# Patient Record
Sex: FEMALE
[De-identification: names, ages, dates, MRNs, and addresses within clinical notes are randomized per-mention and may not be internally consistent; named-entity substitution may affect disease eponyms.]

---

## 2017-06-28 ENCOUNTER — RESULT REVIEW (OUTPATIENT)
Age: 62
End: 2017-06-28

## 2021-03-31 PROBLEM — Z00.00 ENCOUNTER FOR PREVENTIVE HEALTH EXAMINATION: Status: ACTIVE | Noted: 2021-03-31

## 2021-05-11 DIAGNOSIS — N39.0 URINARY TRACT INFECTION, SITE NOT SPECIFIED: ICD-10-CM

## 2021-05-12 ENCOUNTER — APPOINTMENT (OUTPATIENT)
Dept: UROGYNECOLOGY | Facility: CLINIC | Age: 66
End: 2021-05-12
Payer: COMMERCIAL

## 2021-05-12 DIAGNOSIS — N81.4 UTEROVAGINAL PROLAPSE, UNSPECIFIED: ICD-10-CM

## 2021-05-12 DIAGNOSIS — N95.2 POSTMENOPAUSAL ATROPHIC VAGINITIS: ICD-10-CM

## 2021-05-12 DIAGNOSIS — N39.8 OTHER SPECIFIED DISORDERS OF URINARY SYSTEM: ICD-10-CM

## 2021-05-12 PROCEDURE — 99205 OFFICE O/P NEW HI 60 MIN: CPT

## 2021-05-12 PROCEDURE — 51798 US URINE CAPACITY MEASURE: CPT

## 2021-05-12 PROCEDURE — 99072 ADDL SUPL MATRL&STAF TM PHE: CPT

## 2021-05-12 RX ORDER — ESTRADIOL 0.1 MG/G
0.1 CREAM VAGINAL
Qty: 1 | Refills: 3 | Status: ACTIVE | COMMUNITY
Start: 2021-05-12 | End: 1900-01-01

## 2021-05-12 NOTE — PHYSICAL EXAM
[No Acute Distress] : in no acute distress [Well developed] : well developed [Well Nourished] : ~L well nourished [Good Hygeine] : demonstrates good hygeine [Oriented x3] : oriented to person, place, and time [Normal Memory] : ~T memory was ~L unimpaired [Normal Mood/Affect] : mood and affect are normal [Normal Lung Sounds] : the lungs were clear to auscultation [Respirations regular] : ~T respiratory rate was regular [Rate & Rhythm Regular] : ~T heart rate and rhythm were normal [No Edema] : ~T edema was not present [Supple] : ~T the neck demonstrated no ~M decrease in suppleness [Thyroid Normal] : the thyroid ~T showed no abnormalities [Symmetrical] : the neck was ~L symmetrical [Scar] : a scar was noted [Normal Gait] : gait was normal [Labia Majora] : were normal [Labia Minora] : were normal [Normal Appearance] : general appearance was normal [Atrophy] : atrophy [No Bleeding] : there was no active vaginal bleeding [Aa ____] : Aa [unfilled] [Ba ____] : Ba [unfilled] [C ____] : C [unfilled] [GH ____] : GH [unfilled] [PB ____] : PB [unfilled] [TVL ____] : TVL  [unfilled] [Ap ____] : Ap [unfilled] [Bp ____] : Bp [unfilled] [D ____] : D [unfilled] [] : II [Post Void Residual ____ml] : post void residual was [unfilled] ml [Exam Deferred] : was deferred [Anxiety] : patient is not anxious [Cough] : no cough [Dyspnea] : no dyspnea [Murmurs] : no murmurs were heard [Varicose Veins] : no varicose veins observed [Tracheal Deviation] : no tracheal deviation observed [Mass] : no ~M [unfilled] neck mass was observed [Mass (___ Cm)] : no ~M [unfilled] abdominal mass was palpated [Tenderness] : ~T no ~M abdominal tenderness observed [Distended] : not distended [H/Smegaly] : no hepatosplenomegaly [Hernia] : no hernia observed [Normal] : was normal [Estrogen Effect] : no estrogen effect was observed

## 2021-05-12 NOTE — ASSESSMENT
[FreeTextEntry1] : Today's findings were discussed with the pt and written pamphlets were provided for vaginal prolapse and vaginal atrophy.  After discussing various options of treatment for , she opted to proceed with pessary fitting.\par She was prescribed with estrogen cream.\par Total time spent for counseling was 1 hr\par \par

## 2021-05-12 NOTE — HISTORY OF PRESENT ILLNESS
[FreeTextEntry1] : The pt is a 66 y/o P3 with prolapse for last few yrs, which is bothersome for her, feels annoyed but cannot put a number on it. She is here for second opinion. \par She denies UI. \par She feels complete emptying of her bladder, however sometimes needs to sit longer on the toilet to completely empty. \par She voids every 2-3 hours with a varied volume and has nocturia 1x with a medium volume\par Her liquid intake consists of 1 cup coffee, 48 oz water /day.\par She has a BM q day. Feels complete evacuation. \par She denies gross hematuria, recurrent UTIs, hx of nephrolithiaisis. Denies splinting. \par Her last PAP was , and she denies a hx of abnormal PAP.\par The last time she had intercourse was 1 week. Denies dyspareunia.  \par Her abdominal surgical hx is significant for  x1. She denies surgical complications.\par She has no history of bruising, excessive bleeding, petechiae.\par \par

## 2021-05-15 ENCOUNTER — NON-APPOINTMENT (OUTPATIENT)
Age: 66
End: 2021-05-15

## 2021-05-15 LAB — BACTERIA UR CULT: NORMAL

## 2023-12-20 ENCOUNTER — LABORATORY RESULT (OUTPATIENT)
Age: 68
End: 2023-12-20

## 2023-12-20 ENCOUNTER — APPOINTMENT (OUTPATIENT)
Dept: PULMONOLOGY | Facility: CLINIC | Age: 68
End: 2023-12-20
Payer: COMMERCIAL

## 2023-12-20 VITALS
HEIGHT: 66 IN | RESPIRATION RATE: 16 BRPM | DIASTOLIC BLOOD PRESSURE: 76 MMHG | WEIGHT: 106 LBS | BODY MASS INDEX: 17.04 KG/M2 | OXYGEN SATURATION: 96 % | HEART RATE: 60 BPM | SYSTOLIC BLOOD PRESSURE: 129 MMHG

## 2023-12-20 LAB — DEPRECATED D DIMER PPP IA-ACNC: <150 NG/ML DDU

## 2023-12-20 PROCEDURE — 36415 COLL VENOUS BLD VENIPUNCTURE: CPT

## 2023-12-20 PROCEDURE — 99204 OFFICE O/P NEW MOD 45 MIN: CPT | Mod: 25

## 2023-12-20 PROCEDURE — 94010 BREATHING CAPACITY TEST: CPT

## 2023-12-20 PROCEDURE — 95012 NITRIC OXIDE EXP GAS DETER: CPT

## 2023-12-20 PROCEDURE — 71046 X-RAY EXAM CHEST 2 VIEWS: CPT

## 2023-12-20 NOTE — PHYSICAL EXAM
[No Acute Distress] : no acute distress [Well Nourished] : well nourished [Well Developed] : well developed [II] : Mallampati Class: II [No Resp Distress] : no resp distress [No Acc Muscle Use] : no acc muscle use [Clear to Auscultation Bilaterally] : clear to auscultation bilaterally [Oriented x3] : oriented x3 [TextBox_11] : +post nasal drip

## 2023-12-20 NOTE — PROCEDURE
[FreeTextEntry1] : the labs were drawn in the office today 12/2023 niox 25 12/2023 moderate obstruction PA and lateral chest xray performed using standard projections. Bones and soft tissues structures are unremarkable.Cardiac silhouette appears normal. Lung fields are clear. No infiltrate or masses noted. Mediastinal contours are normal. IMPRESSION: no acute cardiopulmonary disease

## 2023-12-20 NOTE — HISTORY OF PRESENT ILLNESS
[Never] : never [TextBox_4] : ELY QUINTERO is a 68 year old female who presents with cough X 5 weeks ago went to  in [place]- reportely swab negatives no xray done s/p zpak   + post nasal adrip no history of asthma  no dyspnea no wheezing  cough throughout the day dry cough   reports PCP in the past doing annual xray- does not have images from them

## 2023-12-21 ENCOUNTER — NON-APPOINTMENT (OUTPATIENT)
Age: 68
End: 2023-12-21

## 2023-12-21 LAB — NT-PROBNP SERPL-MCNC: 103 PG/ML

## 2023-12-26 LAB
A ALTERNATA IGE QN: <0.1 KUA/L
A FLAVUS AB FLD QL: NEGATIVE
A FUMIGATUS AB FLD QL: NEGATIVE
A FUMIGATUS IGE QN: <0.1 KUA/L
A NIGER AB FLD QL: NEGATIVE
ALMOND IGE QN: <0.1 KUA/L
BRAZIL NUT IGE QN: <0.1 KUA/L
C ALBICANS IGE QN: <0.1 KUA/L
C HERBARUM IGE QN: <0.1 KUA/L
CASHEW NUT IGE QN: <0.1 KUA/L
CAT DANDER IGE QN: <0.1 KUA/L
CODFISH IGE QN: <0.1 KUA/L
COMMON RAGWEED IGE QN: <0.1 KUA/L
COW MILK IGE QN: <0.1 KUA/L
D FARINAE IGE QN: <0.1 KUA/L
D PTERONYSS IGE QN: <0.1 KUA/L
DEPRECATED A ALTERNATA IGE RAST QL: 0 (ref 0–?)
DEPRECATED A FUMIGATUS IGE RAST QL: 0 (ref 0–?)
DEPRECATED ALMOND IGE RAST QL: 0 (ref 0–?)
DEPRECATED BRAZIL NUT IGE RAST QL: 0 (ref 0–?)
DEPRECATED C ALBICANS IGE RAST QL: 0
DEPRECATED C HERBARUM IGE RAST QL: 0 (ref 0–?)
DEPRECATED CASHEW NUT IGE RAST QL: 0 (ref 0–?)
DEPRECATED CAT DANDER IGE RAST QL: 0 (ref 0–?)
DEPRECATED CODFISH IGE RAST QL: 0 (ref 0–?)
DEPRECATED COMMON RAGWEED IGE RAST QL: 0 (ref 0–?)
DEPRECATED COW MILK IGE RAST QL: 0 (ref 0–?)
DEPRECATED D FARINAE IGE RAST QL: 0 (ref 0–?)
DEPRECATED D PTERONYSS IGE RAST QL: 0 (ref 0–?)
DEPRECATED DOG DANDER IGE RAST QL: 0 (ref 0–?)
DEPRECATED EGG WHITE IGE RAST QL: 0 (ref 0–?)
DEPRECATED HAZELNUT IGE RAST QL: 0 (ref 0–?)
DEPRECATED M RACEMOSUS IGE RAST QL: 0
DEPRECATED PEANUT IGE RAST QL: 0 (ref 0–?)
DEPRECATED ROACH IGE RAST QL: 0 (ref 0–?)
DEPRECATED SALMON IGE RAST QL: 0 (ref 0–?)
DEPRECATED SCALLOP IGE RAST QL: <0.1 KUA/L
DEPRECATED SESAME SEED IGE RAST QL: 0 (ref 0–?)
DEPRECATED SHRIMP IGE RAST QL: 0 (ref 0–?)
DEPRECATED SOYBEAN IGE RAST QL: 0 (ref 0–?)
DEPRECATED TIMOTHY IGE RAST QL: 0 (ref 0–?)
DEPRECATED TUNA IGE RAST QL: 0 (ref 0–?)
DEPRECATED WALNUT IGE RAST QL: 0 (ref 0–?)
DEPRECATED WHEAT IGE RAST QL: 0 (ref 0–?)
DEPRECATED WHITE OAK IGE RAST QL: 0 (ref 0–?)
DOG DANDER IGE QN: <0.1 KUA/L
EGG WHITE IGE QN: <0.1 KUA/L
HAZELNUT IGE QN: <0.1 KUA/L
M RACEMOSUS IGE QN: <0.1 KUA/L
PEANUT IGE QN: <0.1 KUA/L
ROACH IGE QN: <0.1 KUA/L
SALMON IGE QN: <0.1 KUA/L
SCALLOP IGE QN: 0 (ref 0–?)
SCALLOP IGE QN: <0.1 KUA/L
SESAME SEED IGE QN: <0.1 KUA/L
SOYBEAN IGE QN: <0.1 KUA/L
TIMOTHY IGE QN: <0.1 KUA/L
TOTAL IGE SMQN RAST: 7 KU/L
TUNA IGE QN: <0.1 KUA/L
WALNUT IGE QN: <0.1 KUA/L
WHEAT IGE QN: <0.1 KUA/L
WHITE OAK IGE QN: <0.1 KUA/L

## 2023-12-28 ENCOUNTER — APPOINTMENT (OUTPATIENT)
Dept: PULMONOLOGY | Facility: CLINIC | Age: 68
End: 2023-12-28
Payer: COMMERCIAL

## 2023-12-28 VITALS
RESPIRATION RATE: 16 BRPM | DIASTOLIC BLOOD PRESSURE: 67 MMHG | SYSTOLIC BLOOD PRESSURE: 110 MMHG | OXYGEN SATURATION: 95 % | HEART RATE: 71 BPM

## 2023-12-28 PROCEDURE — 94060 EVALUATION OF WHEEZING: CPT

## 2023-12-28 PROCEDURE — 99214 OFFICE O/P EST MOD 30 MIN: CPT | Mod: 25

## 2023-12-28 RX ORDER — FLUTICASONE PROPIONATE 50 UG/1
50 SPRAY, METERED NASAL
Qty: 1 | Refills: 3 | Status: ACTIVE | COMMUNITY
Start: 2023-12-28 | End: 1900-01-01

## 2023-12-28 NOTE — HISTORY OF PRESENT ILLNESS
[TextBox_4] : ELY QUINTERO is a 68 year old female, with history of GERD, who presents to the office for follow up evaluation. Patient reports that she continues to have a cough that has been ongoing for 6 weeks. She denies of having any known Hx of pulmonary diseases.She was seen in our office chest x-ray was performed this was normal spirometry showed evidence of obstruction and she was started Flovent this is not.  She initially states that this was the first time she ever had significant cough however patient does have a preceding history of acid reflux disease.She is also complaining of nasal drip and nasal congestion

## 2023-12-28 NOTE — PROCEDURE
[FreeTextEntry1] : Spirometry demonstrates  airway obstruction with no improvement post bronchodilator.  Review of chest x-ray shows no significant findings

## 2023-12-28 NOTE — ASSESSMENT
[FreeTextEntry1] : Ms. ELY QUINTERO is an 68 year old female, history of GERD. Chronic cough with normal chest x-ray and mild airway obstruction indicated in spirometry. Prescribed Fluticasone nasal spray 2 sprays each nostril once a day, Symbicort 2 puffs twice a day, and Omeprazole 20 mg 1 tablet twice a day. Follow up in 1 month. Patient concerned about lung cancer.  I reassured her based on her chest x-ray I told her however that her symptoms do not resolve after 1 month of intensive treatment we will consider chest CT for reassurance  Recorded time for visit excludes procedures done in office and includes review of outside reports and imaging if appropriate and indicated above

## 2024-02-02 ENCOUNTER — APPOINTMENT (OUTPATIENT)
Dept: PULMONOLOGY | Facility: CLINIC | Age: 69
End: 2024-02-02
Payer: COMMERCIAL

## 2024-02-02 VITALS
OXYGEN SATURATION: 96 % | DIASTOLIC BLOOD PRESSURE: 77 MMHG | TEMPERATURE: 97.6 F | BODY MASS INDEX: 17.04 KG/M2 | HEART RATE: 60 BPM | WEIGHT: 106 LBS | SYSTOLIC BLOOD PRESSURE: 152 MMHG | HEIGHT: 66 IN

## 2024-02-02 PROCEDURE — 99213 OFFICE O/P EST LOW 20 MIN: CPT | Mod: 25

## 2024-02-02 PROCEDURE — 94010 BREATHING CAPACITY TEST: CPT

## 2024-02-03 NOTE — ASSESSMENT
[FreeTextEntry1] : Ms. ELY QUINTERO is an 68 year old female, history of GERD. Chronic cough with normal chest x-ray and mild airway obstruction indicated in spirometry. Clinically is doing much better from the last visit I suspect this is mostly secondary to treatment of acid reflux disease.  I would like to keep her off inhalers for now to see if there is any significant change in her breathing off inhalers.  We will get back to the issue of airway obstruction in the future.

## 2024-02-03 NOTE — ADDENDUM
[FreeTextEntry1] : I, Bradylisa Luis Armando, acted solely as a scribe for Dr. Jonh Villatoro M.D. on this date 12/28/2023.   All medical record entries made by the Scribe were at my, Dr. John Villatoro M.D., direction and personally dictated by me on 12/28/2023. I have reviewed the chart and agree that the record accurately reflects my personal performance of the history, physical exam, assessment and plan. I have also personally directed, reviewed, and agreed with the chart.

## 2024-02-03 NOTE — HISTORY OF PRESENT ILLNESS
[TextBox_4] : Prior: ELY QUINTERO is a 68 year old female, with history of GERD, who presents to the office for follow up evaluation. Patient reports that she continues to have a cough that has been ongoing for 6 weeks. She denies of having any known Hx of pulmonary diseases.She was seen in our office chest x-ray was performed this was normal spirometry showed evidence of obstruction and she was started Flovent this is not.  She initially states that this was the first time she ever had significant cough however patient does have a preceding history of acid reflux disease.  Current: Follow-up for cough overall doing much better.  Significant reduction in cough and wheezing.  Has not been using inhaler on a consistent basis and stopped using nasal sprays.

## 2024-04-16 ENCOUNTER — APPOINTMENT (OUTPATIENT)
Dept: PULMONOLOGY | Facility: CLINIC | Age: 69
End: 2024-04-16
Payer: COMMERCIAL

## 2024-04-16 VITALS — SYSTOLIC BLOOD PRESSURE: 122 MMHG | HEART RATE: 63 BPM | DIASTOLIC BLOOD PRESSURE: 71 MMHG | OXYGEN SATURATION: 96 %

## 2024-04-16 DIAGNOSIS — R05.3 CHRONIC COUGH: ICD-10-CM

## 2024-04-16 PROCEDURE — 94060 EVALUATION OF WHEEZING: CPT

## 2024-04-16 PROCEDURE — 99213 OFFICE O/P EST LOW 20 MIN: CPT | Mod: 25

## 2024-04-16 PROCEDURE — 95012 NITRIC OXIDE EXP GAS DETER: CPT

## 2024-04-16 RX ORDER — OMEPRAZOLE 20 MG/1
20 CAPSULE, DELAYED RELEASE ORAL
Qty: 60 | Refills: 1 | Status: ACTIVE | COMMUNITY
Start: 2023-12-28 | End: 1900-01-01

## 2024-04-16 RX ORDER — FLUTICASONE PROPIONATE 110 UG/1
110 AEROSOL, METERED RESPIRATORY (INHALATION) TWICE DAILY
Qty: 1 | Refills: 0 | Status: COMPLETED | COMMUNITY
Start: 2023-12-20 | End: 2024-04-16

## 2024-04-16 RX ORDER — BUDESONIDE AND FORMOTEROL FUMARATE DIHYDRATE 160; 4.5 UG/1; UG/1
160-4.5 AEROSOL RESPIRATORY (INHALATION)
Qty: 1 | Refills: 5 | Status: COMPLETED | COMMUNITY
Start: 2023-12-28 | End: 2024-04-16

## 2024-04-17 NOTE — PROCEDURE
[FreeTextEntry1] : Spirometry demonstrates obstructive pattern nonspecific bronchodilator response no interval change

## 2024-04-17 NOTE — HISTORY OF PRESENT ILLNESS
[TextBox_4] : F/u cough   Continues to have cough for a few months now, along with mucus and fatigue; was worse about 2 weeks ago is getting slightly better  Currently not on inhalers   Reports significant worsening of respiratory symptoms and cough

## 2024-04-17 NOTE — ASSESSMENT
[FreeTextEntry1] : Chronic cough.  Has evidence of airways disease and elevated NIOX however the change in these data points is relatively small compared to the patient's worsening symptoms.  Would resume treatment for acid reflux disease

## 2024-05-17 ENCOUNTER — APPOINTMENT (OUTPATIENT)
Dept: PULMONOLOGY | Facility: CLINIC | Age: 69
End: 2024-05-17

## 2024-07-12 ENCOUNTER — LABORATORY RESULT (OUTPATIENT)
Age: 69
End: 2024-07-12

## 2024-07-12 ENCOUNTER — APPOINTMENT (OUTPATIENT)
Dept: FAMILY MEDICINE | Facility: CLINIC | Age: 69
End: 2024-07-12
Payer: COMMERCIAL

## 2024-07-12 VITALS
WEIGHT: 105 LBS | TEMPERATURE: 98 F | DIASTOLIC BLOOD PRESSURE: 77 MMHG | SYSTOLIC BLOOD PRESSURE: 127 MMHG | BODY MASS INDEX: 16.88 KG/M2 | HEIGHT: 66 IN | HEART RATE: 59 BPM | OXYGEN SATURATION: 99 %

## 2024-07-12 DIAGNOSIS — M81.8 OTHER OSTEOPOROSIS W/OUT CURRENT PATHOLOGICAL FRACTURE: ICD-10-CM

## 2024-07-12 DIAGNOSIS — F51.01 PRIMARY INSOMNIA: ICD-10-CM

## 2024-07-12 DIAGNOSIS — Z00.00 ENCOUNTER FOR GENERAL ADULT MEDICAL EXAMINATION W/OUT ABNORMAL FINDINGS: ICD-10-CM

## 2024-07-12 DIAGNOSIS — K59.00 CONSTIPATION, UNSPECIFIED: ICD-10-CM

## 2024-07-12 PROCEDURE — 99387 INIT PM E/M NEW PAT 65+ YRS: CPT

## 2024-07-12 PROCEDURE — G2211 COMPLEX E/M VISIT ADD ON: CPT | Mod: NC,1L

## 2024-07-12 PROCEDURE — 36415 COLL VENOUS BLD VENIPUNCTURE: CPT

## 2024-07-12 PROCEDURE — 81003 URINALYSIS AUTO W/O SCOPE: CPT | Mod: QW

## 2024-07-12 RX ORDER — ZOLPIDEM TARTRATE 10 MG/1
10 TABLET ORAL
Qty: 30 | Refills: 3 | Status: ACTIVE | COMMUNITY
Start: 2024-07-12 | End: 1900-01-01

## 2024-07-12 RX ORDER — POLYETHYLENE GLYCOL 3350 17 G/17G
17 POWDER, FOR SOLUTION ORAL DAILY
Qty: 30 | Refills: 3 | Status: ACTIVE | COMMUNITY
Start: 2024-07-12 | End: 1900-01-01

## 2024-07-15 LAB
25(OH)D3 SERPL-MCNC: 24.8 NG/ML
ALBUMIN SERPL ELPH-MCNC: 4.5 G/DL
ALP BLD-CCNC: 78 U/L
ALT SERPL-CCNC: 12 U/L
APPEARANCE: CLEAR
AST SERPL-CCNC: 23 U/L
BASOPHILS # BLD AUTO: 0.04 K/UL
BASOPHILS NFR BLD AUTO: 0.8 %
BILIRUB SERPL-MCNC: 0.5 MG/DL
BILIRUBIN URINE: NEGATIVE
BUN SERPL-MCNC: 19 MG/DL
CALCIUM SERPL-MCNC: 9.9 MG/DL
CHLORIDE SERPL-SCNC: 103 MMOL/L
CHOLEST SERPL-MCNC: 256 MG/DL
CO2 SERPL-SCNC: 26 MMOL/L
COLOR: YELLOW
CREAT SERPL-MCNC: 0.91 MG/DL
EOSINOPHIL # BLD AUTO: 0.07 K/UL
EOSINOPHIL NFR BLD AUTO: 1.4 %
ESTIMATED AVERAGE GLUCOSE: 108 MG/DL
FOLATE SERPL-MCNC: 11.8 NG/ML
GLUCOSE QUALITATIVE U: NEGATIVE MG/DL
GLUCOSE SERPL-MCNC: 93 MG/DL
HBA1C MFR BLD HPLC: 5.4 %
HCT VFR BLD CALC: 49.1 %
HDLC SERPL-MCNC: 67 MG/DL
HGB BLD-MCNC: 15.7 G/DL
IMM GRANULOCYTES NFR BLD AUTO: 0.2 %
KETONES URINE: NEGATIVE MG/DL
LDLC SERPL CALC-MCNC: 168 MG/DL
LEUKOCYTE ESTERASE URINE: ABNORMAL
LYMPHOCYTES # BLD AUTO: 1.23 K/UL
LYMPHOCYTES NFR BLD AUTO: 25.3 %
MCHC RBC-ENTMCNC: 28 PG
MCHC RBC-ENTMCNC: 32 GM/DL
MONOCYTES # BLD AUTO: 0.4 K/UL
MONOCYTES NFR BLD AUTO: 8.2 %
NEUTROPHILS # BLD AUTO: 3.12 K/UL
NEUTROPHILS NFR BLD AUTO: 64.1 %
NITRITE URINE: NEGATIVE
NONHDLC SERPL-MCNC: 189 MG/DL
PH URINE: 7
PLATELET # BLD AUTO: 243 K/UL
POTASSIUM SERPL-SCNC: 5 MMOL/L
PROT SERPL-MCNC: 7 G/DL
PROTEIN URINE: NEGATIVE MG/DL
RBC # BLD: 5.61 M/UL
RBC # FLD: 12.5 %
SODIUM SERPL-SCNC: 140 MMOL/L
SPECIFIC GRAVITY URINE: 1.01
TRIGL SERPL-MCNC: 117 MG/DL
TSH SERPL-ACNC: 1.62 UIU/ML
UROBILINOGEN URINE: 0.2 MG/DL
VIT B12 SERPL-MCNC: 595 PG/ML
WBC # FLD AUTO: 4.87 K/UL

## 2024-08-28 ENCOUNTER — TRANSCRIPTION ENCOUNTER (OUTPATIENT)
Age: 69
End: 2024-08-28

## 2024-11-18 ENCOUNTER — NON-APPOINTMENT (OUTPATIENT)
Age: 69
End: 2024-11-18

## 2025-01-17 DIAGNOSIS — R92.30 DENSE BREASTS, UNSPECIFIED: ICD-10-CM

## 2025-02-10 ENCOUNTER — NON-APPOINTMENT (OUTPATIENT)
Age: 70
End: 2025-02-10

## 2025-07-16 ENCOUNTER — APPOINTMENT (OUTPATIENT)
Dept: FAMILY MEDICINE | Facility: CLINIC | Age: 70
End: 2025-07-16
Payer: COMMERCIAL

## 2025-07-16 VITALS
BODY MASS INDEX: 17.36 KG/M2 | RESPIRATION RATE: 16 BRPM | OXYGEN SATURATION: 97 % | SYSTOLIC BLOOD PRESSURE: 100 MMHG | HEART RATE: 53 BPM | TEMPERATURE: 98.7 F | DIASTOLIC BLOOD PRESSURE: 60 MMHG | WEIGHT: 108 LBS | HEIGHT: 66 IN

## 2025-07-16 PROBLEM — Z83.49 FAMILY HISTORY OF OBESITY: Status: ACTIVE | Noted: 2025-07-16

## 2025-07-16 PROBLEM — E78.00 HYPERCHOLESTEROLEMIA: Status: ACTIVE | Noted: 2025-07-16

## 2025-07-16 PROCEDURE — 36415 COLL VENOUS BLD VENIPUNCTURE: CPT

## 2025-07-16 PROCEDURE — 99397 PER PM REEVAL EST PAT 65+ YR: CPT

## 2025-07-16 RX ORDER — PRAVASTATIN SODIUM 10 MG/1
10 TABLET ORAL EVERY OTHER DAY
Qty: 90 | Refills: 0 | Status: ACTIVE | COMMUNITY
Start: 2025-07-16

## 2025-07-17 LAB
25(OH)D3 SERPL-MCNC: 35.4 NG/ML
ALBUMIN SERPL ELPH-MCNC: 4.6 G/DL
ALP BLD-CCNC: 83 U/L
ALT SERPL-CCNC: 17 U/L
ANION GAP SERPL CALC-SCNC: 16 MMOL/L
AST SERPL-CCNC: 30 U/L
BASOPHILS # BLD AUTO: 0.05 K/UL
BASOPHILS NFR BLD AUTO: 0.7 %
BILIRUB SERPL-MCNC: 0.5 MG/DL
BUN SERPL-MCNC: 22 MG/DL
CALCIUM SERPL-MCNC: 9.9 MG/DL
CHLORIDE SERPL-SCNC: 101 MMOL/L
CHOLEST SERPL-MCNC: 267 MG/DL
CO2 SERPL-SCNC: 22 MMOL/L
CREAT SERPL-MCNC: 0.84 MG/DL
EGFRCR SERPLBLD CKD-EPI 2021: 74 ML/MIN/1.73M2
EOSINOPHIL # BLD AUTO: 0.09 K/UL
EOSINOPHIL NFR BLD AUTO: 1.3 %
ESTIMATED AVERAGE GLUCOSE: 111 MG/DL
FOLATE SERPL-MCNC: 19.7 NG/ML
GLUCOSE SERPL-MCNC: 86 MG/DL
HBA1C MFR BLD HPLC: 5.5 %
HCT VFR BLD CALC: 51.1 %
HDLC SERPL-MCNC: 69 MG/DL
HGB BLD-MCNC: 16 G/DL
IMM GRANULOCYTES NFR BLD AUTO: 0.3 %
LDLC SERPL-MCNC: 181 MG/DL
LYMPHOCYTES # BLD AUTO: 1.68 K/UL
LYMPHOCYTES NFR BLD AUTO: 23.7 %
MAGNESIUM SERPL-MCNC: 2.5 MG/DL
MAN DIFF?: NORMAL
MCHC RBC-ENTMCNC: 28.9 PG
MCHC RBC-ENTMCNC: 31.3 G/DL
MCV RBC AUTO: 92.2 FL
MONOCYTES # BLD AUTO: 0.56 K/UL
MONOCYTES NFR BLD AUTO: 7.9 %
NEUTROPHILS # BLD AUTO: 4.69 K/UL
NEUTROPHILS NFR BLD AUTO: 66.1 %
NONHDLC SERPL-MCNC: 197 MG/DL
PLATELET # BLD AUTO: 228 K/UL
POTASSIUM SERPL-SCNC: 4.5 MMOL/L
PROT SERPL-MCNC: 7.1 G/DL
RBC # BLD: 5.54 M/UL
RBC # FLD: 13.2 %
SODIUM SERPL-SCNC: 139 MMOL/L
TRIGL SERPL-MCNC: 94 MG/DL
TSH SERPL-ACNC: 1.69 UIU/ML
VIT B12 SERPL-MCNC: 547 PG/ML
WBC # FLD AUTO: 7.09 K/UL